# Patient Record
Sex: MALE | Race: WHITE | NOT HISPANIC OR LATINO | Employment: OTHER | URBAN - METROPOLITAN AREA
[De-identification: names, ages, dates, MRNs, and addresses within clinical notes are randomized per-mention and may not be internally consistent; named-entity substitution may affect disease eponyms.]

---

## 2022-12-15 DIAGNOSIS — Z12.11 SCREENING FOR COLON CANCER: ICD-10-CM

## 2022-12-15 DIAGNOSIS — Z80.0 FAMILY HISTORY OF COLON CANCER: Primary | ICD-10-CM

## 2022-12-15 NOTE — TELEPHONE ENCOUNTER
Reached out to patient and l/m for patient and l/m for him to call back get chart updated and scheduled and he is due 3/6/2023 every 5 yrs. ALANA

## 2023-01-05 VITALS — HEIGHT: 72 IN | BODY MASS INDEX: 28.44 KG/M2 | WEIGHT: 210 LBS

## 2023-01-05 RX ORDER — PANTOPRAZOLE SODIUM 40 MG/1
40 TABLET, DELAYED RELEASE ORAL DAILY
COMMUNITY
Start: 2022-11-30

## 2023-01-05 RX ORDER — AMLODIPINE BESYLATE 5 MG/1
5 TABLET ORAL DAILY
COMMUNITY
Start: 2022-07-16

## 2023-01-05 RX ORDER — ASPIRIN 81 MG/1
81 TABLET ORAL DAILY
COMMUNITY

## 2023-01-05 RX ORDER — MOEXIPRIL HYDROCHLORIDE 15 MG/1
15 TABLET, FILM COATED ORAL DAILY
COMMUNITY
Start: 2022-11-30

## 2023-01-05 RX ORDER — CLONAZEPAM 1 MG/1
1 TABLET ORAL DAILY
COMMUNITY
Start: 2022-12-14

## 2023-01-05 NOTE — TELEPHONE ENCOUNTER
Returned call to patient and got chart updated and scheduled for repeat colonoscopy. Pt voiced understood instructions. Pt doesn't have hx of cpap, heart stent, or walking problem. I emailed instructions to patient. ALANA

## 2023-01-06 ENCOUNTER — TELEPHONE (OUTPATIENT)
Dept: GASTROENTEROLOGY | Facility: CLINIC | Age: 63
End: 2023-01-06
Payer: COMMERCIAL

## 2023-01-06 DIAGNOSIS — Z80.0 FAMILY HISTORY OF COLON CANCER: Primary | ICD-10-CM

## 2023-01-06 DIAGNOSIS — Z12.11 SCREENING FOR COLON CANCER: ICD-10-CM

## 2023-01-06 RX ORDER — SOD SULF/POT CHLORIDE/MAG SULF 1.479 G
12 TABLET ORAL DAILY
Qty: 24 TABLET | Refills: 0 | Status: SHIPPED | OUTPATIENT
Start: 2023-01-06

## 2023-01-06 NOTE — TELEPHONE ENCOUNTER
[facility]  [address] [place] [address]  Phone: [phone]  Fax: [phone]    History & Physical         Provider: Dr. Ena Crews    Patient Name: Flynn PLEITEZ (age):1960  62 y.o.           Gender: male   Phone: [phone]     Referring Physician: Lonny Cheek     Vital Signs:   Height - 6'  Weight - 210 lb   BMI -  28.48    Plan: Colonoscopy @ [facility]     Encounter Diagnoses   Name Primary?    Family history of colon cancer Yes    Screening for colon cancer            History:      Past Medical History:   Diagnosis Date    Anxiety disorder, unspecified     BMI 28.0-28.9,adult     Essential (primary) hypertension     GERD (gastroesophageal reflux disease)     Sleep trouble       Past Surgical History:   Procedure Laterality Date    COLONOSCOPY      COLONOSCOPY  2018    diverticulosis    ESOPHAGOGASTRODUODENOSCOPY  2017    WISDOM TOOTH EXTRACTION        Medication List with Changes/Refills   Current Medications    AMLODIPINE (NORVASC) 5 MG TABLET    Take 5 mg by mouth once daily.    ASPIRIN (ECOTRIN) 81 MG EC TABLET    Take 81 mg by mouth once daily.    CLONAZEPAM (KLONOPIN) 1 MG TABLET    Take 1 mg by mouth once daily.    MOEXIPRIL (UNIVASC) 15 MG TAB    Take 15 mg by mouth once daily.    PANTOPRAZOLE (PROTONIX) 40 MG TABLET    Take 40 mg by mouth once daily.    SOD SULF-POT CHLORIDE-MAG SULF (SUTAB) 1.479-0.188- 0.225 GRAM TABLET    Take 12 tablets by mouth once daily. Take according to package instructions with indicated amount of water. No breakfast day before test. May substitute with Suprep, Clenpiq, Plenvu, Moviprep or GoLytely based on Rx plan and patient preference.      Review of patient's allergies indicates:  No Known Allergies   Family History   Problem Relation Age of Onset    Clotting disorder Mother     Colon cancer Father 64      Social History      Tobacco Use    Smoking status: Never    Smokeless tobacco: Never   Substance Use Topics    Alcohol use: Yes     Alcohol/week: 3.0 standard drinks     Types: 1 Glasses of wine, 1 Cans of beer, 1 Shots of liquor per week    Drug use: Never        Physical Examination:     General Appearance:___________________________  HEENT: _____________________________________  Abdomen:____________________________________  Heart:________________________________________  Lungs:_______________________________________  Extremities:___________________________________  Skin:_________________________________________  Endocrine:____________________________________  Genitourinary:_________________________________  Neurological:__________________________________      Patient has been evaluated immediately prior to sedation and is medically cleared for endoscopy with IVCS as an ASA class: ______      Physician Signature: _________________________       Date: ________  Time: ________

## 2023-02-23 NOTE — TELEPHONE ENCOUNTER
"[facility]  [address] [place] [address]  Phone: [phone]  Fax: [phone]    History & Physical         Provider: Dr. Ena Crews    Patient Name: Flynn PLEITEZ (age):1960  62 y.o.           Gender: male   Phone: [phone]     Referring Physician: Lonny Cheek     Vital Signs:   Height - 6' 0"  Weight - 210 lb  BMI -  28.48    Plan: Colonoscopy    Encounter Diagnoses   Name Primary?    Family history of colon cancer Yes    Screening for colon cancer            History:      Past Medical History:   Diagnosis Date    Anxiety disorder, unspecified     BMI 28.0-28.9,adult     Essential (primary) hypertension     GERD (gastroesophageal reflux disease)     Sleep trouble       Past Surgical History:   Procedure Laterality Date    COLONOSCOPY      COLONOSCOPY  2018    diverticulosis    ESOPHAGOGASTRODUODENOSCOPY  2017    WISDOM TOOTH EXTRACTION        Medication List with Changes/Refills   Current Medications    AMLODIPINE (NORVASC) 5 MG TABLET    Take 5 mg by mouth once daily.    ASPIRIN (ECOTRIN) 81 MG EC TABLET    Take 81 mg by mouth once daily.    CLONAZEPAM (KLONOPIN) 1 MG TABLET    Take 1 mg by mouth once daily.    MOEXIPRIL (UNIVASC) 15 MG TAB    Take 15 mg by mouth once daily.    PANTOPRAZOLE (PROTONIX) 40 MG TABLET    Take 40 mg by mouth once daily.    SOD SULF-POT CHLORIDE-MAG SULF (SUTAB) 1.479-0.188- 0.225 GRAM TABLET    Take 12 tablets by mouth once daily. Take according to package instructions with indicated amount of water. No breakfast day before test. May substitute with Suprep, Clenpiq, Plenvu, Moviprep or GoLytely based on Rx plan and patient preference.      Review of patient's allergies indicates:  No Known Allergies   Family History   Problem Relation Age of Onset    Clotting disorder Mother     Colon cancer Father 64      Social History     Tobacco " Use    Smoking status: Never    Smokeless tobacco: Never   Substance Use Topics    Alcohol use: Yes     Alcohol/week: 3.0 standard drinks     Types: 1 Glasses of wine, 1 Cans of beer, 1 Shots of liquor per week    Drug use: Never        Physical Examination:     General Appearance:___________________________  HEENT: _____________________________________  Abdomen:____________________________________  Heart:________________________________________  Lungs:_______________________________________  Extremities:___________________________________  Skin:_________________________________________  Endocrine:____________________________________  Genitourinary:_________________________________  Neurological:__________________________________      Patient has been evaluated immediately prior to sedation and is medically cleared for endoscopy with IVCS as an ASA class: ______      Physician Signature: _________________________       Date: ________  Time: ________

## 2023-03-06 ENCOUNTER — TELEPHONE (OUTPATIENT)
Dept: GASTROENTEROLOGY | Facility: CLINIC | Age: 63
End: 2023-03-06
Payer: COMMERCIAL

## 2023-03-06 NOTE — TELEPHONE ENCOUNTER
----- Message from Tayler Bowie sent at 3/6/2023  1:47 PM CST -----  Contact: brad (Spouse)  Requesting a call back regarding the colonoscopy prep the patient would have to take prior to upcoming colonoscopy - it's two weeks away. Please call back at [phone] (Brad - spouse).

## 2023-03-08 ENCOUNTER — TELEPHONE (OUTPATIENT)
Dept: GASTROENTEROLOGY | Facility: CLINIC | Age: 63
End: 2023-03-08
Payer: COMMERCIAL

## 2023-03-08 NOTE — TELEPHONE ENCOUNTER
----- Message from Tona Ayon sent at 3/8/2023  1:23 PM CST -----  Regarding: pt advice  Contact: Lachelle/wife  Lachelle/wife states that pharm has not received rx for sod sulf-pot chloride-mag sulf (SUTAB) 1.479-0.188- 0.225 gram tablet. Pt uses     [id number] - [place] [id number] [address]  Phone: [phone] Fax: [phone]    Please call back at [phone]//thank you acc

## 2023-03-16 ENCOUNTER — OUTSIDE PLACE OF SERVICE (OUTPATIENT)
Dept: GASTROENTEROLOGY | Facility: CLINIC | Age: 63
End: 2023-03-16
Payer: COMMERCIAL

## 2023-03-16 LAB — CRC RECOMMENDATION EXT: NORMAL

## 2023-03-16 PROCEDURE — 45385 PR COLONOSCOPY,REMV LESN,SNARE: ICD-10-PCS | Mod: 33,,, | Performed by: INTERNAL MEDICINE

## 2023-03-16 PROCEDURE — 45385 COLONOSCOPY W/LESION REMOVAL: CPT | Mod: 33,,, | Performed by: INTERNAL MEDICINE

## 2023-03-20 ENCOUNTER — TELEPHONE (OUTPATIENT)
Dept: GASTROENTEROLOGY | Facility: CLINIC | Age: 63
End: 2023-03-20
Payer: COMMERCIAL

## 2023-03-20 NOTE — LETTER
March 31, 2023    Flynn Barnettman  [address]  [place] [address]             [facility]  [address] [place] [address]  Phone: [phone]  Fax: [phone] Dear Mr. Strauss:    Our office has made multiple attempts to reach you by phone. Your healthcare is important to us. Please contact us at your earliest convenience at [phone].      Sincerely,        Ena Crews MD

## 2023-03-21 NOTE — TELEPHONE ENCOUNTER
1 TA, 1 hyp, repeat colonoscopy in 5 years.     Notify patient. Update in Health Maintenance section of Epic.  NBP

## 2023-03-31 NOTE — TELEPHONE ENCOUNTER
Attempted to reach pt again. No answer. LVM. Letter mailed to pt's home address asking him to contact our office.

## 2023-04-27 ENCOUNTER — DOCUMENTATION ONLY (OUTPATIENT)
Dept: GASTROENTEROLOGY | Facility: CLINIC | Age: 63
End: 2023-04-27
Payer: COMMERCIAL